# Patient Record
Sex: FEMALE | Race: BLACK OR AFRICAN AMERICAN
[De-identification: names, ages, dates, MRNs, and addresses within clinical notes are randomized per-mention and may not be internally consistent; named-entity substitution may affect disease eponyms.]

---

## 2017-05-09 ENCOUNTER — HOSPITAL ENCOUNTER (EMERGENCY)
Dept: HOSPITAL 74 - JER | Age: 27
Discharge: HOME | End: 2017-05-09
Payer: COMMERCIAL

## 2017-05-09 VITALS — HEART RATE: 84 BPM | SYSTOLIC BLOOD PRESSURE: 119 MMHG | DIASTOLIC BLOOD PRESSURE: 67 MMHG | TEMPERATURE: 97.9 F

## 2017-05-09 VITALS — BODY MASS INDEX: 24.9 KG/M2

## 2017-05-09 DIAGNOSIS — B95.8: ICD-10-CM

## 2017-05-09 DIAGNOSIS — T81.4XXA: Primary | ICD-10-CM

## 2017-05-09 NOTE — PDOC
History of Present Illness





- General


Chief Complaint: Wound


Stated Complaint: WOUND CHECK


Time Seen by Provider: 17 01:57


History Source: Patient





- History of Present Illness


Initial Comments: 





17 02:33


26 year old female with 6mm pustule to navel piercing site. piercing removed 6 

months ago. history of "staph infections" denies fever/ chills





Past History





- Past Medical History


Allergies/Adverse Reactions: 


 Allergies











Allergy/AdvReac Type Severity Reaction Status Date / Time


 


oxytocin [From Pitocin] Allergy   Verified 17 01:50











Home Medications: 


Ambulatory Orders





Sulfamethoxazole/Trimethoprim [Bactrim Ds -] 1 tab PO BID #14 tablet 17 








Anemia: Yes


Asthma: Yes





- Reproductive History


 (#): 9


Para: 2


Cervical CA: No


Dysfunctional Uterine Bleeding: No


Ectopic Pregnancy: No


Endometrial CA: No


Polycystic Ovaries: No


Tubal Ligation: No





- Immunization History


Immunization Up to Date: Yes





- Psycho/Social/Smoking Cessation Hx


Anxiety: No


Suicidal Ideation: No


Smoking Status: No


Smoking History: Never smoked


Have you smoked in the past 12 months: No


Number of Cigarettes Smoked Daily: 3


Information on smoking cessation initiated: No


'Breaking Loose' booklet given: 16


Hx Alcohol Use: No


Drug/Substance Use Hx: No


Substance Use Type: Alcohol, Marijuana


Hx Substance Use Treatment: No





*Physical Exam





- Vital Signs


 Last Vital Signs











Temp Pulse Resp BP Pulse Ox


 


 97.9 F   84   20   119/67   99 


 


 17 01:51  17 01:51  17 01:51  17 01:51  17 01:51














*DC/Admit/Observation/Transfer


Diagnosis at time of Disposition: 


Wound abscess


Qualifiers:


 Encounter type: initial encounter Qualified Code(s): T81.4XXA - Infection 

following a procedure, initial encounter





- Discharge Dispostion


Disposition: HOME





- Prescriptions


Prescriptions: 


Sulfamethoxazole/Trimethoprim [Bactrim Ds -] 1 tab PO BID #14 tablet





- Patient Instructions


Printed Discharge Instructions:  DI for Skin Abscess


Additional Instructions: 


apply warm compress to the area. 





take bactrim as prescribed. 








take tylenol/ ibuprofen for pain.








follow up with your doctor in 2 days for a wound check.








return to the ER if symptoms worsen.

## 2017-07-19 ENCOUNTER — HOSPITAL ENCOUNTER (EMERGENCY)
Dept: HOSPITAL 74 - JER | Age: 27
LOS: 1 days | Discharge: HOME | End: 2017-07-20
Payer: COMMERCIAL

## 2017-07-19 VITALS — DIASTOLIC BLOOD PRESSURE: 80 MMHG | HEART RATE: 89 BPM | TEMPERATURE: 98.3 F | SYSTOLIC BLOOD PRESSURE: 106 MMHG

## 2017-07-19 VITALS — BODY MASS INDEX: 25.7 KG/M2

## 2017-07-19 DIAGNOSIS — Z87.891: ICD-10-CM

## 2017-07-19 DIAGNOSIS — O21.0: ICD-10-CM

## 2017-07-19 DIAGNOSIS — O26.891: Primary | ICD-10-CM

## 2017-07-19 PROCEDURE — 3E033GC INTRODUCTION OF OTHER THERAPEUTIC SUBSTANCE INTO PERIPHERAL VEIN, PERCUTANEOUS APPROACH: ICD-10-PCS

## 2017-07-19 PROCEDURE — 3E0337Z INTRODUCTION OF ELECTROLYTIC AND WATER BALANCE SUBSTANCE INTO PERIPHERAL VEIN, PERCUTANEOUS APPROACH: ICD-10-PCS

## 2017-07-19 NOTE — PDOC
History of Present Illness





- General


History Source: Patient


Exam Limitations: No Limitations





- History of Present Illness


Initial Comments: 


The patient is a 28 yo F who is approximately 2 months pregnant with no PMHx 

who presents with vomiting for several days.  Patient states she is unable to 

tolerate any food.  Patient is  and had c sections for both her previous 

children.  Patient denies vaginal bleeding, dysuria, and hematuria. The patient 

endorses abdominal cramping. She denies chest pain, palpitations and 

lightheadedness. The patient has not followed up with her OBGYN yet.





PCP: Alfredo Hyman








<Jade Mcpherson - Last Filed: 17 00:01>





- General


History Source: Patient





<Louis Lemons - Last Filed: 17 02:37>





- General


Chief Complaint: Nausea/Vomiting


Stated Complaint: Nausea/Vomiting


Time Seen by Provider: 17 23:37





Past History





<Jade Mcpherson - Last Filed: 17 00:01>





- Past Medical History


Anemia: Yes


Asthma: Yes





- Reproductive History


 (#): 9


Para: 2


Cervical CA: No


Dysfunctional Uterine Bleeding: No


Ectopic Pregnancy: No


Endometrial CA: No


Polycystic Ovaries: No


Tubal Ligation: No





- Immunization History


Immunization Up to Date: Yes





- Psycho/Social/Smoking Cessation Hx


Anxiety: No


Suicidal Ideation: No


Smoking Status: No


Smoking History: Former smoker


Have you smoked in the past 12 months: No


Number of Cigarettes Smoked Daily: 3


Information on smoking cessation initiated: No


'Breaking Loose' booklet given: 16


Hx Alcohol Use: No


Drug/Substance Use Hx: No


Substance Use Type: Alcohol, Marijuana


Hx Substance Use Treatment: No





<Louis Lemons - Last Filed: 17 02:37>





- Past Medical History


Allergies/Adverse Reactions: 


 Allergies











Allergy/AdvReac Type Severity Reaction Status Date / Time


 


oxytocin [From Pitocin] Allergy   Verified 17 22:50











Home Medications: 


Ambulatory Orders





Metoclopramide HCl [Reglan -] 10 mg PO TID #60 tablet 17 











**Review of Systems





- Review of Systems


Able to Perform ROS?: Yes


Comments:: 


CONSTITUTIONAL:


Absent: fever, no chills, no fatigue


EYES:


Absent: visual changes


ENT:


Absent: ear pain, no sore throat


CARDIOVASCULAR:


Absent: chest pain, no palpitations


RESPIRATORY:


Absent: cough, no SOB


GI: +nausea, vomiting, abdominal cramping


Absent: no constipation, no diarrhea


GENITOURINARY:


Absent: dysuria, no frequency, no hematuria


MUSKULOSKELETAL:


Absent: back pain, no arthralgia, no myalgia


SKIN:


Absent: rash











<Jade Mcpherson - Last Filed: 17 00:01>





*Physical Exam





- Vital Signs


 Last Vital Signs











Temp Pulse Resp BP Pulse Ox


 


 98.3 F   89   20   106/80   100 


 


 17 22:53  17 22:53  17 22:53  17 22:53  17 22:53














- Physical Exam


Comments: 


GENERAL:


Well-appearing, well-nourished. No apparent distress.


HEENT:


Normocephalic, atraumatic. PERRL, EOM intact.


CARDIOVASCULAR:


Normal S1, S2. Regular rate and rhythm.


PULMONARY:


Clear to auscultation bilaterally.


ABDOMEN:


Soft, non-distended, non-tender.


EXTREMITIES:


Normal ROM in all four extremities. No gross deformities.


SKIN:


Warm, dry.  No rash


NEUROLOGICAL:


No focal neurological deficits.








<Jade Mcpherson - Last Filed: 17 00:01>





- Vital Signs


 Last Vital Signs











Temp Pulse Resp BP Pulse Ox


 


 98.3 F   89   20   106/80   100 


 


 17 22:53  17 22:53  17 22:53  17 22:53  17 22:53














<Louis Lemons - Last Filed: 17 02:37>





ED Treatment Course





- LABORATORY


CBC & Chemistry Diagram: 


 17 00:15





 17 00:15





<Louis Lemons - Last Filed: 17 02:37>





Medical Decision Making





- Medical Decision Making


17 02:35


Dr. Lemons: The scribe's documentation has been prepared under my direction 

and personally reviewed by me in its entirery. I confirm that the note above 

accurately reflects all work, treatment, procedures, and medical decision 

making performed by me.





17 02:36


US show IUP at 9 weeks plus gestation with FHT in the 180's pt to follow up 

with her Ob/gyn on friday





<Louis Lemons - Last Filed: 17 02:37>





*DC/Admit/Observation/Transfer





- Attestations


Scribe Attestion: 





Documentation prepared by Jade Mcpherson, acting as medical scribe for Louis Lemons MD/DO.








<Jade Mcpherson - Last Filed: 17 00:01>





- Discharge Dispostion


Admit: No





<Louis Lemons - Last Filed: 17 02:37>


Diagnosis at time of Disposition: 


 Hyperemesis gravidarum





- Discharge Dispostion


Disposition: HOME


Condition at time of disposition: Stable





- Prescriptions


Prescriptions: 


Metoclopramide HCl [Reglan -] 10 mg PO TID #60 tablet





- Referrals


Referrals: 


Alfredo Hyman MD [Primary Care Provider] - 





- Patient Instructions


Printed Discharge Instructions:  DI for Hyperemesis Gravidarum


Additional Instructions: 


Please drink plenty of fluids and take medication as directed.  Follow up with 

your appointment take is scheduled for Friday.  Return of any problems.

## 2017-07-20 LAB
ACETONE SERPL-MCNC: NEGATIVE MG/DL
ALBUMIN SERPL-MCNC: 3.5 G/DL (ref 3.4–5)
ALP SERPL-CCNC: 62 U/L (ref 45–117)
ALT SERPL-CCNC: 28 U/L (ref 12–78)
ANION GAP SERPL CALC-SCNC: 9 MMOL/L (ref 8–16)
AST SERPL-CCNC: 19 U/L (ref 15–37)
BASOPHILS # BLD: 0.5 % (ref 0–2)
BILIRUB SERPL-MCNC: 1.3 MG/DL (ref 0.2–1)
BUN SERPL-MCNC: 8 MG/DL (ref 7–18)
CALCIUM SERPL-MCNC: 9.2 MG/DL (ref 8.5–10.1)
CHLORIDE SERPL-SCNC: 100 MMOL/L (ref 98–107)
CO2 SERPL-SCNC: 27 MMOL/L (ref 21–32)
CREAT SERPL-MCNC: 0.6 MG/DL (ref 0.55–1.02)
DEPRECATED RDW RBC AUTO: 12.7 % (ref 11.6–15.6)
EOSINOPHIL # BLD: 0.7 % (ref 0–4.5)
GLUCOSE SERPL-MCNC: 89 MG/DL (ref 74–106)
HCT VFR BLD CALC: 42.1 % (ref 32.4–45.2)
HGB BLD-MCNC: 13.8 GM/DL (ref 10.7–15.3)
INR BLD: 1.16 (ref 0.82–1.09)
LYMPHOCYTES # BLD: 27.3 % (ref 8–40)
MAGNESIUM SERPL-MCNC: 2 MG/DL (ref 1.8–2.4)
MCH RBC QN AUTO: 29.3 PG (ref 25.7–33.7)
MCHC RBC AUTO-ENTMCNC: 32.9 G/DL (ref 32–36)
MCV RBC: 89 FL (ref 80–96)
MONOCYTES # BLD AUTO: 10.6 % (ref 3.8–10.2)
NEUTROPHILS # BLD: 60.9 % (ref 42.8–82.8)
PLATELET # BLD AUTO: 215 K/MM3 (ref 134–434)
PMV BLD: 8.8 FL (ref 7.5–11.1)
POTASSIUM SERPLBLD-SCNC: 3.9 MMOL/L (ref 3.5–5.1)
PROT SERPL-MCNC: 6.9 G/DL (ref 6.4–8.2)
PT PNL PPP: 12.8 SEC (ref 9.98–11.88)
RBC # BLD AUTO: 4.73 M/MM3 (ref 3.6–5.2)
SODIUM SERPL-SCNC: 136 MMOL/L (ref 136–145)
WBC # BLD AUTO: 7.5 K/MM3 (ref 4–10)

## 2017-09-23 ENCOUNTER — HOSPITAL ENCOUNTER (EMERGENCY)
Dept: HOSPITAL 74 - JER | Age: 27
Discharge: HOME | End: 2017-09-23
Payer: COMMERCIAL

## 2017-09-23 VITALS — DIASTOLIC BLOOD PRESSURE: 73 MMHG | SYSTOLIC BLOOD PRESSURE: 118 MMHG | TEMPERATURE: 98.2 F | HEART RATE: 87 BPM

## 2017-09-23 VITALS — BODY MASS INDEX: 26.6 KG/M2

## 2017-09-23 DIAGNOSIS — O26.892: Primary | ICD-10-CM

## 2017-09-23 DIAGNOSIS — O20.8: ICD-10-CM

## 2017-09-23 DIAGNOSIS — Z3A.19: ICD-10-CM

## 2017-09-23 LAB
ALBUMIN SERPL-MCNC: 3.1 G/DL (ref 3.4–5)
ALP SERPL-CCNC: 76 U/L (ref 45–117)
ALT SERPL-CCNC: 16 U/L (ref 12–78)
ANION GAP SERPL CALC-SCNC: 8 MMOL/L (ref 8–16)
AST SERPL-CCNC: 12 U/L (ref 15–37)
BILIRUB SERPL-MCNC: 0.6 MG/DL (ref 0.2–1)
CALCIUM SERPL-MCNC: 8.8 MG/DL (ref 8.5–10.1)
CO2 SERPL-SCNC: 24 MMOL/L (ref 21–32)
CREAT SERPL-MCNC: 0.5 MG/DL (ref 0.55–1.02)
DEPRECATED RDW RBC AUTO: 13.6 % (ref 11.6–15.6)
GLUCOSE SERPL-MCNC: 77 MG/DL (ref 74–106)
MCH RBC QN AUTO: 29.1 PG (ref 25.7–33.7)
MCHC RBC AUTO-ENTMCNC: 33.1 G/DL (ref 32–36)
MCV RBC: 88 FL (ref 80–96)
PLATELET # BLD AUTO: 216 K/MM3 (ref 134–434)
PMV BLD: 8.2 FL (ref 7.5–11.1)
PROT SERPL-MCNC: 6.4 G/DL (ref 6.4–8.2)
WBC # BLD AUTO: 7.2 K/MM3 (ref 4–10)

## 2017-09-23 NOTE — PDOC
Attending Attestation





- Resident


Resident Name: Jose Moyer





- ED Attending Attestation


I have performed the following: I have examined & evaluated the patient, The 

case was reviewed & discussed with the resident, I agree w/resident's findings 

& plan, Exceptions are as noted





- HPI


HPI: 





09/23/17 16:08


26 yo F currently 19 weeks pregnant here with c/o vaginal spotting. no 

cramping. occasional pain with turning.  otherwise no abd pain. no urinary 

complaints. prenatal care is at planned [facility]. 





- Physicial Exam


PE: 





09/23/17 16:12


awake alert lungs clear . heart rrr no mrg abd soft nt gravid. ext wwp  no 

edema. nuero alert oriented.





- Medical Decision Making





09/23/17 16:13


26 yo F with 19 week pregnancy vaginal bleeding. plan labs, us ru/o previa or 

incompetent cervix. nancy outpt followup. pt is RH positive.

## 2017-09-23 NOTE — PDOC
History of Present Illness





- General


Chief Complaint: Vaginal Bleeding


Stated Complaint: SPOTTING (20 WKS PREGNANT)


Time Seen by Provider: 17 12:03


History Source: Patient


Exam Limitations: No Limitations





- History of Present Illness


Initial Comments: 





17 12:59


Patient is a  at 19w4d by US on  with history of asthma and anemia here 

today complaining of vaginal bleeding. It started last night with small amounts 

of pink discharge, then patient had some spotting today. She endorses 

associated sharp pains that are intermittent and infrequent that are located in 

her lower abdomen. She also reports that she is being monitored for a placenta 

that is "pushed forward" and that she had an ultrasound planned for the . 

She says she called her OB ([facility] in [facility]), who told her 

to come in to get checked out. She denies nausea, vomiting, fevers, chills, 

cramping, contractions, pelvic pain or inflammation. 





Past History





- Past Medical History


Allergies/Adverse Reactions: 


 Allergies











Allergy/AdvReac Type Severity Reaction Status Date / Time


 


oxytocin [From Pitocin] Allergy   Verified 17 12:00











Home Medications: 


Ambulatory Orders





NK [No Known Home Medication]  17 








Anemia: Yes


Asthma: Yes





- Reproductive History


 (#): 9


Para: 2


Cervical CA: No


Dysfunctional Uterine Bleeding: No


Ectopic Pregnancy: No


Endometrial CA: No


Polycystic Ovaries: No


Therapeutic (s) & number: No


Tubal Ligation: No


Spontaneous : 0





- Immunization History


Immunization Up to Date: Yes





- Suicide/Smoking/Psychosocial Hx


Smoking Status: No


Smoking History: Never smoked


Have you smoked in the past 12 months: No


Number of Cigarettes Smoked Daily: 3


'Breaking Loose' booklet given: 16


Hx Alcohol Use: No


Drug/Substance Use Hx: No


Substance Use Type: Alcohol, Marijuana


Hx Substance Use Treatment: No





**Review of Systems





- Review of Systems


Comments:: 





17 13:04


GENERAL/CONSTITUTIONAL: No fever or chills. No weakness.


HEAD, EYES, EARS, NOSE AND THROAT: No change in vision.  No sore throat.


CARDIOVASCULAR: No chest pain or shortness of breath


RESPIRATORY: No cough, wheezing, or hemoptysis.


GASTROINTESTINAL: No nausea, vomiting, diarrhea or constipation.


GENITOURINARY: No dysuria, frequency, or change in urination.


MUSCULOSKELETAL: No joint or muscle swelling or pain. No neck or back pain.


SKIN: No rash


NEUROLOGIC: No headache, vertigo, loss of consciousness, or change in strength/

sensation.


HEMATOLOGIC/LYMPHATIC: History of anemia. No easy bleeding.


ALLERGIC/IMMUNOLOGIC: No hives or skin allergy.








*Physical Exam





- Vital Signs


 Last Vital Signs











Temp Pulse Resp BP Pulse Ox


 


 98.2 F   87   20   118/73   100 


 


 17 11:56  17 11:56  17 11:56  17 11:56  17 11:56














- Physical Exam


Comments: 





17 13:04


GENERAL: Awake, alert, and fully oriented, in no acute distress


HEAD: No signs of trauma, normocephalic, atraumatic


EYES: PERRLA, EOMI, sclera anicteric, conjunctiva clear


ENT: Auricles normal inspection, hearing grossly normal, nares patent, 

oropharynx clear without


exudates. Moist mucosa


LUNGS: No distress, speaks full sentences, clear to auscultation bilaterally


HEART: Regular rate and rhythm, normal S1 and S2, no murmurs, rubs or gallops, 

peripheral pulses normal and equal bilaterally.


ABDOMEN: Soft, nontender, normoactive bowel sounds.  No guarding, no rebound.  

Uterine fundus at umbilicus


EXTREMITIES: Normal inspection, Normal range of motion, no edema.  No clubbing 

or cyanosis.


NEUROLOGICAL: Cranial nerves II through XII grossly intact.  Normal speech, no 

focal sensorimotor deficits


SKIN: Warm, Dry, normal turgor, no rashes or lesions noted.


PELVIC: Os visualized and closed, no bleeding, white discharge, no erythema














ED Treatment Course





- LABORATORY


CBC & Chemistry Diagram: 


 17 12:40





 17 12:40





- RADIOLOGY


Radiology Studies Ordered: 














 Category Date Time Status


 


 PREGNANCY LIMITED US [US] Stat Ultrasound  17 12:55 Ordered














Medical Decision Making





- Medical Decision Making


17 13:05


Patient is a 27F  at 19w4d with history of anemia and possible placenta 

previa here today complaining of vaginal bleeding. Vital signs stable and 

normal. Pelvic exam reassuring. Will evaluate with CBC, CMP and ultrasound due 

to history of anemia and possible history of placenta previa.





17 13:55


 Laboratory Tests











  17





  12:40


 


WBC  7.2


 


Hgb  12.8


 


Hct  38.6


 


Plt Count  216








CBC normal, CMP reassuring. Ultrasound pending.





17 15:21


Ultrasound shows single uterine pregnancy, placenta is anterior (no previa), 

shows no abruption. Discharged to home with outpatient OB follow up.





*DC/Admit/Observation/Transfer


Diagnosis at time of Disposition: 


 Vaginal bleeding





- Discharge Dispostion


Disposition: HOME


Condition at time of disposition: Good


Admit: No





- Patient Instructions


Printed Discharge Instructions:  DI for Vaginal Bleeding During Pregnancy

## 2017-12-28 ENCOUNTER — HOSPITAL ENCOUNTER (EMERGENCY)
Dept: HOSPITAL 74 - JERFT | Age: 27
Discharge: HOME | End: 2017-12-28
Payer: COMMERCIAL

## 2017-12-28 VITALS — TEMPERATURE: 98 F | DIASTOLIC BLOOD PRESSURE: 79 MMHG | HEART RATE: 92 BPM | SYSTOLIC BLOOD PRESSURE: 123 MMHG

## 2017-12-28 VITALS — BODY MASS INDEX: 27.9 KG/M2

## 2017-12-28 DIAGNOSIS — J00: ICD-10-CM

## 2017-12-28 DIAGNOSIS — Z3A.35: ICD-10-CM

## 2017-12-28 DIAGNOSIS — O99.89: Primary | ICD-10-CM

## 2017-12-28 NOTE — PDOC
History of Present Illness





- General


Chief Complaint: Respiratory


Stated Complaint: RESPIRATORY


Time Seen by Provider: 17 13:33


History Source: Patient


Exam Limitations: No Limitations





- History of Present Illness


Initial Comments: 





17 14:11


Patient is a [27-year-old female currently 34 weeks pregnant was just in OB 

being evaluated for vaginal discharge, was cleared and sent down to emergency 

department to rule out upper respiratory infection versus influenza.  Patient 

reports nasal congestion, cough and cold-like symptoms denies fever, denies any 

urinary symptoms, no nausea vomiting or diarrhea.]





Past Medical History: [Denies].  





Allergies: No known allergies





Medications: [Prenatal vitamins]





Family History: Non-contributory





Social History: Denies smoking, alcohol use, or IVDU





Vital signs on arrival are [notable for pulse of 92.]





Review of Systems


GENERAL/CONSTITUTIONAL: [No fever or chills. No weakness. No weight change.]


HEAD, EYES, EARS, NOSE AND THROAT: [No change in vision. No ear pain or 

discharge. No sore throat. Nasal congestion]


CARDIOVASCULAR: [No chest pain or shortness of breath.]


RESPIRATORY: [ nonproductive cough, no wheezing, or hemoptysis.]


GASTROINTESTINAL: [No nausea, vomiting, diarrhea or constipation. No rectal 

bleeding.]


GENITOURINARY: [No dysuria, frequency, or change in urination.]


MUSCULOSKELETAL: [No joint or muscle swelling or pain. No neck or back pain.]


SKIN AND BREASTS: [No rash or easy bruising.]


NEUROLOGIC: [No headache, vertigo, loss of consciousness, or loss of sensation.]


PSYCHIATRIC: [No depression or anxiety.]


ENDOCRINE: [No increased thirst. No abnormal weight change.]


HEMATOLOGIC/LYMPHATIC: [No anemia, easy bleeding, or history of blood clots.]


ALLERGIC/IMMUNOLOGIC: [No hives or skin allergy. No latex allergy.]





Physical Exam: 


GENERAL: [The patient is awake, alert, and fully oriented, in no acute distress.

]


HEAD: [Normal with no signs of trauma.]


EYES: [Pupils equal, round and reactive to light, extraocular movements intact, 

sclera anicteric, conjunctiva clear.]


ENT: [Ears normal, nares patent, oropharynx clear without exudates.  Moist 

mucous membranes. No uvula deviation. No sinus pressure or pain]


NECK: [Normal range of motion, supple without lymphadenopathy, JVD, or masses.]


LUNGS: [Breath sounds equal, clear to auscultation bilaterally.  No wheezes, 

and no crackles.]


HEART: [Regular rate and rhythm, normal S1 and S2 without murmur, rub or gallop.

]


ABDOMEN: [Gravid , No guarding, no rebound.  No masses. No bruising or abrasions

]


MUSCULOSKELETAL: [Normal range of motion, no edema.  No clubbing or cyanosis. 

No cords, erythema, or tenderness. No CVA Tenderness with fist.]


NEUROLOGICAL: [Cranial nerves II through XII grossly intact.  Normal speech, 

normal gait.]


SKIN: [Warm, Dry, normal turgor, no rashes or lesions noted.]








Past History





- Past Medical History


Allergies/Adverse Reactions: 


 Allergies











Allergy/AdvReac Type Severity Reaction Status Date / Time


 


No Known Drug Allergies Allergy   Verified 17 13:02











Home Medications: 


Ambulatory Orders





Prenatal Vitamins (Sjr) - 1 tab PO DAILY 17 








Anemia: Yes


Asthma: Yes


COPD: No





- Reproductive History


 (#): 9


Para: 2


Cervical CA: No


Dysfunctional Uterine Bleeding: No


Ectopic Pregnancy: No


Endometrial CA: No


Polycystic Ovaries: No


Therapeutic (s) & number: No


Tubal Ligation: No


Spontaneous : 0





- Immunization History


Immunization Up to Date: Yes





- Suicide/Smoking/Psychosocial Hx


Smoking Status: No


Smoking History: Never smoked


Have you smoked in the past 12 months: No


Number of Cigarettes Smoked Daily: 3


'Breaking Loose' booklet given: 16


Hx Alcohol Use: No


Drug/Substance Use Hx: No


Substance Use Type: Alcohol, Marijuana


Hx Substance Use Treatment: No





*Physical Exam





- Vital Signs


 Last Vital Signs











Temp Pulse Resp BP Pulse Ox


 


 98 F   92 H  18   123/79   99 


 


 17 12:59  17 12:59  17 12:59  17 12:59  17 12:59














Medical Decision Making





- Medical Decision Making





17 14:14


A/P: Patient here for evaluation of cough and cold-like symptoms, currently 

afebrile no sinus pressure pain will send rapid strep patient had intermittent 

sore throat, rapid influenza.


17 14:56


Rapid strep and influenza both negative patient with common cold to follow-up 

as needed, if symptoms persist follow-up with OB/GYN


17 15:45








*DC/Admit/Observation/Transfer


Diagnosis at time of Disposition: 


 Common cold








- Discharge Dispostion


Disposition: HOME


Condition at time of disposition: Stable


Admit: No





- Referrals





- Patient Instructions


Printed Discharge Instructions:  Common Cold


Additional Instructions: 


If any fever, increased cough, or any other concerns return to ER


Common Cold-like symptoms.





- Post Discharge Activity

## 2019-01-08 ENCOUNTER — HOSPITAL ENCOUNTER (EMERGENCY)
Dept: HOSPITAL 74 - JERFT | Age: 29
Discharge: HOME | End: 2019-01-08
Payer: COMMERCIAL

## 2019-01-08 VITALS — BODY MASS INDEX: 31.6 KG/M2

## 2019-01-08 VITALS — DIASTOLIC BLOOD PRESSURE: 81 MMHG | SYSTOLIC BLOOD PRESSURE: 105 MMHG | TEMPERATURE: 98.5 F | HEART RATE: 81 BPM

## 2019-01-08 DIAGNOSIS — S50.861A: ICD-10-CM
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
## 2019-01-08 DIAGNOSIS — S50.862A: Primary | ICD-10-CM

## 2019-01-08 DIAGNOSIS — S80.262A: ICD-10-CM
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
## 2019-01-08 NOTE — PDOC
History of Present Illness





- General


Chief Complaint: Rash


Stated Complaint: RASH


Time Seen by Provider: 19 11:27





- History of Present Illness


Initial Comments: 





19 11:34


28-year-old female without comorbidities presents for evaluation of rash times 

one week without systemic symptoms.





Past History





- Past Medical History


Allergies/Adverse Reactions: 


 Allergies











Allergy/AdvReac Type Severity Reaction Status Date / Time


 


oxytocin [From Pitocin] Allergy   Verified 19 10:49











Home Medications: 


Ambulatory Orders





Prenatal Vitamins (Sjr) - 1 tab PO DAILY 17 








Anemia: Yes


Asthma: Yes


COPD: No


Thyroid Disease: Yes





- Reproductive History


 (#): 9


Para: 2


Cervical CA: No


Dysfunctional Uterine Bleeding: No


Ectopic Pregnancy: No


Endometrial CA: No


Polycystic Ovaries: No


Therapeutic (s) & number: No


Tubal Ligation: No


Spontaneous : 0





- Immunization History


Immunization Up to Date: Yes





- Suicide/Smoking/Psychosocial Hx


Smoking Status: No


Smoking History: Never smoked


Have you smoked in the past 12 months: No


Number of Cigarettes Smoked Daily: 3


Information on smoking cessation initiated: No


'Breaking Loose' booklet given: 16


Hx Alcohol Use: No


Drug/Substance Use Hx: No


Substance Use Type: Alcohol, Marijuana


Hx Substance Use Treatment: No





**Review of Systems





- Review of Systems


Constitutional: No: Fever


HEENTM: No: Throat Pain, Throat Swelling


Respiratory: No: Cough


Integumentary: Yes: Pruritus, Rash





*Physical Exam





- Vital Signs


 Last Vital Signs











Temp Pulse Resp BP Pulse Ox


 


 98.5 F   81   18   105/81   100 


 


 19 10:49  19 10:49  19 10:49  19 10:49  19 10:49














- Physical Exam


Comments: 





19 11:39


HEAD: NC/AT


EYES: Conjuntiva clear


Ears: Canals and TM's normal


NOSE: No d/c


THROAT: Moist mucous membrances, oral pharanx clear, uvula midline


NECK: Supple without adenopathy


CARDIAC: S1 S2


LUNGS: CTA Full and Equal breath sounds


ABDOMEN: Soft NT ND


MS: Full ROM in all joints without edema 


NEUROLOGIC: No gross sensory or motor deficits, NVID


SKIN: Normal color and temperature there are multiple superficial circular flat 

excoriated areas and groups of 3 on bilateral forearms and the back of the 

right knee. There is no indicaton of secondary infection 





Moderate Sedation





- Procedure Monitoring


Vital Signs: 


Procedure Monitoring Vital Signs











Temperature  98.5 F   19 10:49


 


Pulse Rate  81   19 10:49


 


Respiratory Rate  18   19 10:49


 


Blood Pressure  105/81   19 10:49


 


O2 Sat by Pulse Oximetry (%)  100   19 10:49











*DC/Admit/Observation/Transfer


Diagnosis at time of Disposition: 


 Bug bite








- Discharge Dispostion


Disposition: HOME


Condition at time of disposition: Stable


Decision to Admit order: No





- Referrals


Referrals: 


Trever Hyman [Primary Care Provider] - 





- Patient Instructions


Printed Discharge Instructions:  DI for Bed Bug Bites


Additional Instructions: 


Return to the emergency room should symptoms worsen or go unresolved. Please 

wash sheets in hot water, you may use calamine lotion for itching. Return to 

the emergency room should symptoms worsen or go unresolved and follow-up with 

your primary care doctor for further evaluation and treatment options in one to 

2 days.





- Post Discharge Activity

## 2021-08-12 ENCOUNTER — HOSPITAL ENCOUNTER (EMERGENCY)
Dept: HOSPITAL 74 - JER | Age: 31
Discharge: HOME | End: 2021-08-12
Payer: COMMERCIAL

## 2021-08-12 VITALS — HEART RATE: 67 BPM | DIASTOLIC BLOOD PRESSURE: 94 MMHG | SYSTOLIC BLOOD PRESSURE: 126 MMHG | TEMPERATURE: 98.7 F

## 2021-08-12 VITALS — BODY MASS INDEX: 34.7 KG/M2

## 2021-08-12 DIAGNOSIS — E04.9: Primary | ICD-10-CM

## 2024-10-23 ENCOUNTER — HOSPITAL ENCOUNTER (EMERGENCY)
Dept: HOSPITAL 74 - JERFT | Age: 34
Discharge: HOME | End: 2024-10-23
Payer: SELF-PAY

## 2024-10-23 VITALS
SYSTOLIC BLOOD PRESSURE: 110 MMHG | HEART RATE: 98 BPM | RESPIRATION RATE: 20 BRPM | TEMPERATURE: 99.3 F | DIASTOLIC BLOOD PRESSURE: 78 MMHG

## 2024-10-23 VITALS — BODY MASS INDEX: 38.7 KG/M2

## 2024-10-23 DIAGNOSIS — R10.84: ICD-10-CM

## 2024-10-23 DIAGNOSIS — R05.9: ICD-10-CM

## 2024-10-23 DIAGNOSIS — R09.81: ICD-10-CM

## 2024-10-23 DIAGNOSIS — R30.0: ICD-10-CM

## 2024-10-23 DIAGNOSIS — Z20.822: ICD-10-CM

## 2024-10-23 DIAGNOSIS — R50.9: ICD-10-CM

## 2024-10-23 DIAGNOSIS — N12: Primary | ICD-10-CM

## 2024-10-23 DIAGNOSIS — M54.50: ICD-10-CM

## 2024-10-23 LAB
APPEARANCE UR: (no result)
BACTERIA # UR AUTO: (no result) /UL (ref 0–1359)
BILIRUB UR STRIP.AUTO-MCNC: NEGATIVE MG/DL
CASTS URNS QL MICRO: 2 /UL (ref 0–3.1)
COLOR UR: YELLOW
EPITH CASTS URNS QL MICRO: 7 /UL (ref 0–25.1)
KETONES UR QL STRIP: NEGATIVE
LEUKOCYTE ESTERASE UR QL STRIP.AUTO: (no result)
NITRITE UR QL STRIP: POSITIVE
PH UR: 6 [PH] (ref 5–8)
PROT UR QL STRIP: NEGATIVE
PROT UR QL STRIP: NEGATIVE
RBC # BLD AUTO: 26 /UL (ref 0–23.9)
S PYO DNA THROAT QL NAA+PROBE: NOT DETECTED
SP GR UR: 1.02 (ref 1.01–1.03)
UROBILINOGEN UR STRIP-MCNC: 1 MG/DL (ref 0.2–1)
WBC # UR AUTO: 299 /UL (ref 0–25.8)

## 2024-10-23 RX ADMIN — ACETAMINOPHEN ONE MG: 500 TABLET, FILM COATED ORAL at 13:17
